# Patient Record
Sex: MALE | Race: WHITE | Employment: UNEMPLOYED | URBAN - METROPOLITAN AREA
[De-identification: names, ages, dates, MRNs, and addresses within clinical notes are randomized per-mention and may not be internally consistent; named-entity substitution may affect disease eponyms.]

---

## 2021-12-18 ENCOUNTER — APPOINTMENT (OUTPATIENT)
Dept: CT IMAGING | Facility: CLINIC | Age: 31
End: 2021-12-18
Attending: EMERGENCY MEDICINE
Payer: COMMERCIAL

## 2021-12-18 ENCOUNTER — HOSPITAL ENCOUNTER (EMERGENCY)
Facility: CLINIC | Age: 31
Discharge: HOME | End: 2021-12-18
Attending: EMERGENCY MEDICINE | Admitting: EMERGENCY MEDICINE
Payer: COMMERCIAL

## 2021-12-18 VITALS
RESPIRATION RATE: 20 BRPM | OXYGEN SATURATION: 99 % | BODY MASS INDEX: 28.6 KG/M2 | HEIGHT: 75 IN | DIASTOLIC BLOOD PRESSURE: 99 MMHG | SYSTOLIC BLOOD PRESSURE: 142 MMHG | HEART RATE: 88 BPM | TEMPERATURE: 97.4 F | WEIGHT: 230 LBS

## 2021-12-18 DIAGNOSIS — M54.50 ACUTE MIDLINE LOW BACK PAIN WITHOUT SCIATICA: ICD-10-CM

## 2021-12-18 DIAGNOSIS — R10.84 ABDOMINAL PAIN, GENERALIZED: ICD-10-CM

## 2021-12-18 LAB
ALBUMIN SERPL-MCNC: 3.7 G/DL (ref 3.4–5)
ALP SERPL-CCNC: 85 U/L (ref 40–150)
ALT SERPL W P-5'-P-CCNC: 38 U/L (ref 0–70)
ANION GAP SERPL CALCULATED.3IONS-SCNC: 5 MMOL/L (ref 3–14)
AST SERPL W P-5'-P-CCNC: 36 U/L (ref 0–45)
BASOPHILS # BLD AUTO: 0 10E3/UL (ref 0–0.2)
BASOPHILS NFR BLD AUTO: 1 %
BILIRUB SERPL-MCNC: 0.8 MG/DL (ref 0.2–1.3)
BUN SERPL-MCNC: 20 MG/DL (ref 7–30)
CALCIUM SERPL-MCNC: 9.1 MG/DL (ref 8.5–10.1)
CHLORIDE BLD-SCNC: 107 MMOL/L (ref 94–109)
CO2 SERPL-SCNC: 26 MMOL/L (ref 20–32)
CREAT SERPL-MCNC: 0.99 MG/DL (ref 0.66–1.25)
EOSINOPHIL # BLD AUTO: 0.2 10E3/UL (ref 0–0.7)
EOSINOPHIL NFR BLD AUTO: 3 %
ERYTHROCYTE [DISTWIDTH] IN BLOOD BY AUTOMATED COUNT: 13.5 % (ref 10–15)
GFR SERPL CREATININE-BSD FRML MDRD: >90 ML/MIN/1.73M2
GLUCOSE BLD-MCNC: 101 MG/DL (ref 70–99)
HCT VFR BLD AUTO: 39.7 % (ref 40–53)
HGB BLD-MCNC: 12.6 G/DL (ref 13.3–17.7)
HOLD SPECIMEN: NORMAL
IMM GRANULOCYTES # BLD: 0 10E3/UL
IMM GRANULOCYTES NFR BLD: 0 %
LIPASE SERPL-CCNC: 68 U/L (ref 73–393)
LYMPHOCYTES # BLD AUTO: 1.2 10E3/UL (ref 0.8–5.3)
LYMPHOCYTES NFR BLD AUTO: 21 %
MCH RBC QN AUTO: 27.3 PG (ref 26.5–33)
MCHC RBC AUTO-ENTMCNC: 31.7 G/DL (ref 31.5–36.5)
MCV RBC AUTO: 86 FL (ref 78–100)
MONOCYTES # BLD AUTO: 0.5 10E3/UL (ref 0–1.3)
MONOCYTES NFR BLD AUTO: 8 %
NEUTROPHILS # BLD AUTO: 4 10E3/UL (ref 1.6–8.3)
NEUTROPHILS NFR BLD AUTO: 67 %
NRBC # BLD AUTO: 0 10E3/UL
NRBC BLD AUTO-RTO: 0 /100
PLATELET # BLD AUTO: 291 10E3/UL (ref 150–450)
POTASSIUM BLD-SCNC: 3.3 MMOL/L (ref 3.4–5.3)
PROT SERPL-MCNC: 7.3 G/DL (ref 6.8–8.8)
RBC # BLD AUTO: 4.62 10E6/UL (ref 4.4–5.9)
SODIUM SERPL-SCNC: 138 MMOL/L (ref 133–144)
WBC # BLD AUTO: 5.9 10E3/UL (ref 4–11)

## 2021-12-18 PROCEDURE — 85025 COMPLETE CBC W/AUTO DIFF WBC: CPT | Performed by: EMERGENCY MEDICINE

## 2021-12-18 PROCEDURE — 96374 THER/PROPH/DIAG INJ IV PUSH: CPT | Mod: 59

## 2021-12-18 PROCEDURE — 36415 COLL VENOUS BLD VENIPUNCTURE: CPT | Performed by: EMERGENCY MEDICINE

## 2021-12-18 PROCEDURE — 74177 CT ABD & PELVIS W/CONTRAST: CPT

## 2021-12-18 PROCEDURE — 99285 EMERGENCY DEPT VISIT HI MDM: CPT | Mod: 25

## 2021-12-18 PROCEDURE — 80053 COMPREHEN METABOLIC PANEL: CPT | Performed by: EMERGENCY MEDICINE

## 2021-12-18 PROCEDURE — 250N000009 HC RX 250: Performed by: EMERGENCY MEDICINE

## 2021-12-18 PROCEDURE — 96375 TX/PRO/DX INJ NEW DRUG ADDON: CPT | Mod: 59

## 2021-12-18 PROCEDURE — 250N000011 HC RX IP 250 OP 636: Performed by: EMERGENCY MEDICINE

## 2021-12-18 PROCEDURE — 83690 ASSAY OF LIPASE: CPT | Performed by: EMERGENCY MEDICINE

## 2021-12-18 RX ORDER — IOPAMIDOL 755 MG/ML
115 INJECTION, SOLUTION INTRAVASCULAR ONCE
Status: COMPLETED | OUTPATIENT
Start: 2021-12-18 | End: 2021-12-18

## 2021-12-18 RX ORDER — ONDANSETRON 2 MG/ML
4 INJECTION INTRAMUSCULAR; INTRAVENOUS EVERY 30 MIN PRN
Status: DISCONTINUED | OUTPATIENT
Start: 2021-12-18 | End: 2021-12-18 | Stop reason: HOSPADM

## 2021-12-18 RX ORDER — HYDROMORPHONE HYDROCHLORIDE 1 MG/ML
0.5 INJECTION, SOLUTION INTRAMUSCULAR; INTRAVENOUS; SUBCUTANEOUS
Status: DISCONTINUED | OUTPATIENT
Start: 2021-12-18 | End: 2021-12-18 | Stop reason: HOSPADM

## 2021-12-18 RX ORDER — KETOROLAC TROMETHAMINE 15 MG/ML
15 INJECTION, SOLUTION INTRAMUSCULAR; INTRAVENOUS ONCE
Status: COMPLETED | OUTPATIENT
Start: 2021-12-18 | End: 2021-12-18

## 2021-12-18 RX ADMIN — KETOROLAC TROMETHAMINE 15 MG: 15 INJECTION, SOLUTION INTRAMUSCULAR; INTRAVENOUS at 14:50

## 2021-12-18 RX ADMIN — SODIUM CHLORIDE 73 ML: 9 INJECTION, SOLUTION INTRAVENOUS at 16:01

## 2021-12-18 RX ADMIN — ONDANSETRON 4 MG: 2 INJECTION INTRAMUSCULAR; INTRAVENOUS at 14:50

## 2021-12-18 RX ADMIN — IOPAMIDOL 115 ML: 755 INJECTION, SOLUTION INTRAVENOUS at 16:01

## 2021-12-18 ASSESSMENT — MIFFLIN-ST. JEOR: SCORE: 2073.9

## 2021-12-18 NOTE — ED NOTES
Went into room pt asleep at this time .  Pt awakened and pt then falls asleep quickly  Visitor with pt

## 2021-12-18 NOTE — ED TRIAGE NOTES
Non traumatic mid to lower back pain last few days and woke up with worse pain and having difficulty moving around. Also report right groin pain with a bulge, might have hernia.

## 2021-12-18 NOTE — ED PROVIDER NOTES
"  History     Chief Complaint:    Groin Pain and Back Pain      HPI   Eddie Castillo is a 31 year old male who presents with mid back pain for the last few weeks.  Denies any radicular symptoms.  He denies any trauma.  Describes it as a tight spasm type feeling.  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  Denies any injury or falls.  He is then also noted some groin pain and swelling with feelings of a hernia.  Been ongoing for a couple of weeks.  Last night around 3 AM his roommate heard him groaning in bed and that has persisted.  He is having mid back pain as well as groin pain.  He has had some nausea.  Denies any diarrhea or constipation.  Denies fevers or chills.  Denies urinary symptoms.    Review of Systems  Positive for mid back pain and groin pain with nausea negative for radicular symptoms down one leg or the other loss of bowel or bladder function saddle anesthesia fevers or chills all other systems negative    Allergies:      No Known Allergies      Medications:      No current outpatient medications on file.      Past Medical History:        History reviewed. No pertinent past medical history.  There are no problems to display for this patient.       Past Surgical History:        History reviewed. No pertinent surgical history.    Family History:        No family history on file.    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    Physical Exam     Patient Vitals for the past 24 hrs:   BP Temp Temp src Pulse Resp SpO2 Height Weight   12/18/21 1630 (!) 142/99 -- -- -- 20 99 % -- --   12/18/21 1320 (!) 162/76 97.4  F (36.3  C) Oral 88 24 97 % 1.905 m (6' 3\") 104.3 kg (230 lb)       Physical Exam  GENERAL: well developed, pleasant, moaning in pain looks uncomfortable  HEAD: atraumatic  EYES: pupils reactive, extraocular muscles intact, conjunctivae normal  ENT:  mucus membranes " moist  NECK:  trachea midline, normal range of motion  RESPIRATORY: no tachypnea, breath sounds clear to auscultation   CVS: normal S1/S2, no murmurs, intact distal pulses  ABDOMEN: Soft generalized lower abdominal pain fullness to the suprapubic and groin area bilaterally  MUSCULOSKELETAL: Generalized low back pain and mid back pain no rash straight leg raises normal good dorsiflexion plantarflexion of the great toe bilaterally  SKIN: warm and dry, no acute rashes or ulceration  NEURO: GCS 15, cranial nerves intact, alert and oriented x3  PSYCH:  Mood/affect normal        Emergency Department Course       Imaging:  CT Abdomen Pelvis w Contrast   Final Result   IMPRESSION:    1.  No acute process demonstrated.          Laboratory:  Labs Ordered and Resulted from Time of ED Arrival to Time of ED Departure   COMPREHENSIVE METABOLIC PANEL - Abnormal       Result Value    Sodium 138      Potassium 3.3 (*)     Chloride 107      Carbon Dioxide (CO2) 26      Anion Gap 5      Urea Nitrogen 20      Creatinine 0.99      Calcium 9.1      Glucose 101 (*)     Alkaline Phosphatase 85      AST 36      ALT 38      Protein Total 7.3      Albumin 3.7      Bilirubin Total 0.8      GFR Estimate >90     LIPASE - Abnormal    Lipase 68 (*)    CBC WITH PLATELETS AND DIFFERENTIAL - Abnormal    WBC Count 5.9      RBC Count 4.62      Hemoglobin 12.6 (*)     Hematocrit 39.7 (*)     MCV 86      MCH 27.3      MCHC 31.7      RDW 13.5      Platelet Count 291      % Neutrophils 67      % Lymphocytes 21      % Monocytes 8      % Eosinophils 3      % Basophils 1      % Immature Granulocytes 0      NRBCs per 100 WBC 0      Absolute Neutrophils 4.0      Absolute Lymphocytes 1.2      Absolute Monocytes 0.5      Absolute Eosinophils 0.2      Absolute Basophils 0.0      Absolute Immature Granulocytes 0.0      Absolute NRBCs 0.0         Procedures:      Emergency Department Course:           Reviewed:    I reviewed nursing notes, vitals and past  history    Assessments:   I obtained history and examined the patient as noted above.    I rechecked the patient and explained findings.       Consults:            Interventions:    Medications   ketorolac (TORADOL) injection 15 mg (15 mg Intravenous Given 12/18/21 1450)   Saline (73 mLs As instructed Given 12/18/21 1601)   iopamidol (ISOVUE-370) solution 115 mL (115 mLs Intravenous Given 12/18/21 1601)       Disposition:  The patient was discharged to home.    Impression & Plan            CMS Diagnoses:        Medical Decision Making:  Patient presents with mid back pain for the last couple of weeks without radicular symptoms and now groin pain and feeling he may have a hernia.  Patient is up slightly different with his presentation as he seems to be moaning and groaning and other times coming into the room he is sleeping.  Patient has no radicular symptoms and straight leg raise is normal.  Do not suspect sciatica.  He does do physical labor and lifting certainly could be musculoskeletal.  Kidney stone is considered as well.  Not having groin pain although.his symphysis pubis is slightly full there is no obvious hernia.  No obvious hernia on exam.  CT abdomen pelvis is negative for acute pathology.  Patient became quite upset with ER nurse when she requested a urinalysis.  She was concerned about giving him something stronger for narcotics as when she went into see him he was sleeping soundly.  Patient given Toradol went in to give him his results and he was resting again.  Discussed with him the normal findings.  Can try Tylenol and Motrin for his back pain but not finding anything on exam for his groin pain.  He describes a groin pain in the suprapubic area and not into the testicles.  He is nontender testicular exam.    Critical Care time:  none    Covid-19  Eddie Jose Castillo was evaluated during a global COVID-19 pandemic, which necessitated consideration that the patient might be at risk for  infection with the SARS-CoV-2 virus that causes COVID-19.   Applicable protocols for evaluation were followed during the patient's care.   COVID-19 was considered as part of the patient's evaluation. The plan for testing is:  the patient was referred for outpatient testing.        Diagnosis:    ICD-10-CM    1. Abdominal pain, generalized  R10.84    2. Acute midline low back pain without sciatica  M54.50        Discharge Medications:  There are no discharge medications for this patient.        Scribe Disclosure:  Gilbert ZAVALA MD, am serving as a scribe at 2:26 PM on 12/18/2021 to document services personally performed by Gilbert Dobson MD based on my observations and the provider's statements to me.      Gilbert Dobson MD  12/18/21 [id number]

## 2021-12-18 NOTE — ED NOTES
"Informed pt need for urinalysis and pt said\" no and started to holler you dont need one and I will not give you one.\" roommate trying to reason with pt. Pt nohelia.  Md aware    "

## 2024-04-18 ENCOUNTER — OFFICE VISIT (OUTPATIENT)
Dept: BEHAVIORAL HEALTH | Facility: CLINIC | Age: 34
End: 2024-04-18
Payer: MEDICAID

## 2024-04-18 VITALS — HEART RATE: 78 BPM | DIASTOLIC BLOOD PRESSURE: 80 MMHG | SYSTOLIC BLOOD PRESSURE: 118 MMHG

## 2024-04-18 DIAGNOSIS — Z51.81 ENCOUNTER FOR MONITORING OPIOID MAINTENANCE THERAPY: ICD-10-CM

## 2024-04-18 DIAGNOSIS — F11.20 OPIOID USE DISORDER, SEVERE, DEPENDENCE (H): Primary | ICD-10-CM

## 2024-04-18 DIAGNOSIS — Z79.891 ENCOUNTER FOR MONITORING OPIOID MAINTENANCE THERAPY: ICD-10-CM

## 2024-04-18 DIAGNOSIS — F15.90 STIMULANT USE DISORDER: ICD-10-CM

## 2024-04-18 DIAGNOSIS — F32.A DEPRESSION, UNSPECIFIED DEPRESSION TYPE: ICD-10-CM

## 2024-04-18 DIAGNOSIS — F41.9 ANXIETY: ICD-10-CM

## 2024-04-18 LAB
AMPHETAMINE QUAL URINE POCT: NEGATIVE
BARBITURATE QUAL URINE POCT: NEGATIVE
BENZODIAZEPINE QUAL URINE POCT: NEGATIVE
BUPRENORPHINE QUAL URINE POCT: ABNORMAL
COCAINE QUAL URINE POCT: NEGATIVE
CREAT UR-MCNC: 81 MG/DL
CREATININE QUAL URINE POCT: ABNORMAL
INTERNAL QC QUAL URINE POCT: ABNORMAL
MDMA QUAL URINE POCT: NEGATIVE
METHADONE QUAL URINE POCT: NEGATIVE
METHAMPHETAMINE QUAL URINE POCT: NEGATIVE
OPIATE QUAL URINE POCT: NEGATIVE
OXYCODONE QUAL URINE POCT: NEGATIVE
PH QUAL URINE POCT: ABNORMAL
PHENCYCLIDINE QUAL URINE POCT: NEGATIVE
POCT KIT EXPIRATION DATE: ABNORMAL
POCT KIT LOT NUMBER: ABNORMAL
SPECIFIC GRAVITY POCT: 1.01
TEMPERATURE URINE POCT: ABNORMAL
THC QUAL URINE POCT: NEGATIVE

## 2024-04-18 PROCEDURE — 99204 OFFICE O/P NEW MOD 45 MIN: CPT | Performed by: NURSE PRACTITIONER

## 2024-04-18 PROCEDURE — 80305 DRUG TEST PRSMV DIR OPT OBS: CPT | Performed by: NURSE PRACTITIONER

## 2024-04-18 PROCEDURE — G0480 DRUG TEST DEF 1-7 CLASSES: HCPCS | Performed by: NURSE PRACTITIONER

## 2024-04-18 RX ORDER — BUPRENORPHINE AND NALOXONE 12; 3 MG/1; MG/1
1 FILM, SOLUBLE BUCCAL; SUBLINGUAL DAILY
Qty: 28 FILM | Refills: 0 | Status: SHIPPED | OUTPATIENT
Start: 2024-04-18

## 2024-04-18 RX ORDER — BUPROPION HYDROCHLORIDE 150 MG/1
150 TABLET ORAL EVERY MORNING
COMMUNITY
Start: 2024-04-18

## 2024-04-18 RX ORDER — ESCITALOPRAM OXALATE 10 MG/1
10 TABLET ORAL DAILY
COMMUNITY
Start: 2024-04-18

## 2024-04-18 RX ORDER — BUPRENORPHINE AND NALOXONE 8; 2 MG/1; MG/1
1 FILM, SOLUBLE BUCCAL; SUBLINGUAL DAILY
Qty: 14 FILM | Refills: 0 | Status: SHIPPED | OUTPATIENT
Start: 2024-04-18

## 2024-04-18 RX ORDER — QUETIAPINE FUMARATE 50 MG/1
50-100 TABLET, FILM COATED ORAL AT BEDTIME
COMMUNITY
Start: 2024-04-18

## 2024-04-18 ASSESSMENT — COLUMBIA-SUICIDE SEVERITY RATING SCALE - C-SSRS
2. HAVE YOU ACTUALLY HAD ANY THOUGHTS OF KILLING YOURSELF?: NO
TOTAL  NUMBER OF INTERRUPTED ATTEMPTS LIFETIME: NO
1. HAVE YOU WISHED YOU WERE DEAD OR WISHED YOU COULD GO TO SLEEP AND NOT WAKE UP?: NO
ATTEMPT LIFETIME: NO
TOTAL  NUMBER OF ABORTED OR SELF INTERRUPTED ATTEMPTS LIFETIME: NO
6. HAVE YOU EVER DONE ANYTHING, STARTED TO DO ANYTHING, OR PREPARED TO DO ANYTHING TO END YOUR LIFE?: NO

## 2024-04-18 ASSESSMENT — PATIENT HEALTH QUESTIONNAIRE - PHQ9: SUM OF ALL RESPONSES TO PHQ QUESTIONS 1-9: 18

## 2024-04-18 NOTE — PROGRESS NOTES
[facility] Initial Visit    ASSESSMENT/PLAN                                                      There are no diagnoses linked to this encounter.       No follow-ups on file.      Patient counseling completed today:  Discussed mechanism of action, potential risks/benefits/side effects of medications and other recommendations above.    Discussed risk of precipitated withdrawal with initiation of buprenorphine in the presence of full opioid agonists.    Reviewed directions for initiation of buprenorphine to reduce risk of precipitated withdrawal and maximize efficacy.    Harm reduction counseling including never use alone, availability of naloxone, avoiding combination of opioids with benzodiazepines, alcohol, or other sedatives, safer administration.      Discussed importance of avoiding isolation, building a network of supportive relationships, avoiding people/places/things associated with past use to reduce risk of relapse; including motivational interviewing regarding psychosocial treatment for addiction.     SUBJECTIVE                                                      CC/HPI:  Eddie Castillo is a 33 year old male with PMH ***, and opioid use disorder who presents to the [facility] for initial visit.      Brief History:  Eddie Castillo was first seen in [facility] on 04/18/24. They were referred by ***.   Patient's reasons for seeking treatment include ***.     Substance Use History :  Opioids:   Age at first use: ***  Current use: substance: ***; quantity ***; route: *** ; timing of last use: ***;      IV drug use: {YES/NO:60}   History of overdose: {YES/NO:60}  Previous residential or outpatient treatments for addiction : {YES/NO:60}  Previous medication treatments for addiction: {YES/NO:60}  Longest period of sobriety: ***  Medical complications related to substance use: ***  Hepatitis C: ***; Date of most recent testing: ***  HIV: ***; Date of most recent testing:  ***    Taking buprenorphine? {YES/NO:60} How much per day? ***  Number of buprenorphine films/tablets remaining currently: ***  Side effects related to buprenorphine {YES/NO:60} ***  Narcan currently available: {Narcan:373629}    DSM-5 OUD criteria met:  Taken in larger amounts/greater time spent in behavior over longer period of time than intended,{YES/NO:60}   Persistent desire or unsuccessful efforts to cut down or control use/behavior, {YES/NO:60}   A great deal of time is spent in activities necessary to obtain the substance/participate in the behavior or recover from its effects, {YES/NO:60}   Cravings, {YES/NO:60}   Recurrent use/behavior resulting in failure to fulfill major role obligations at work, school, or home, {YES/NO:60}   Continued use/behavior despite having persistent or recurrent social or interpersonal problems caused or exacerbated by effects of use/behavior, {YES/NO:60}   Important social, occupational, or recreational activities are given up or reduced because of use/behavior, {YES/NO:60}   Recurrent use/behavior in situations in which it is physically hazardous, {YES/NO:60}   Continued use/behavior despite knowledge of having a persistent or recurrent physical or psychological problem that is likely to have been caused or exacerbated by use/behavior, {YES/NO:60}   Tolerance, {YES/NO:60}    Withdrawal, {YES/NO:60}     Other Addiction History:  Stimulants (cocaine, methamphetamine, MDMA/ecstasy)   ***  Sedatives/hypnotics/anxiolytics: (benzodiazepines, GHB, Ambien, phenobarbital)  ***  Alcohol:   ***  Nicotine: (cigarettes, vaping, chew/snuff)  ***  Cannabis:   ***  Hallucinogens/Dissociatives: (acid, mushrooms, ketamine)  ***  Eating disorder:  ***  Gambling:   ***        [place] Prescription Drug Monitoring Program Reviewed:  Yes; ***      Pregnancy Status   LMP: ***  Birth control/barriers: ***  Interested in birth control if none currently? {YES/NO:60}  Urine pregnancy test specimen  "obtained and sent to lab:***          PAST PSYCHIATRIC HISTORY:  Diagnoses- ***  Suicide Attempts: {YES / NO:990090::\"No\"}   Hospitalizations: {YES / NO:321060::\"No\"}          No data to display                  If PHQ-9 score of 15 or higher, has [facility] therapist or provider been notified? {YES/NO:181251}    Any current suicidal ideation? {YES/NO:434105}  If yes, has [facility] therapist or provider been notified? {YES/NO:762935}    Mental health provider: *** (follow up on MH referral if needed)      Social History  Housing status: {Living Status:688096}  Employment status: {EMPLOYMENT STATUS:39060781}  Relationship status: {Relationship Status:405317}  Children: { :575079}  Legal: ***  Insurance needs: ***  Contact information up to date? ***    3rd Party Involvement *** (please obtain JACQUELYN if pt would like to include)        Medications:  Current Outpatient Medications   Medication Sig Dispense Refill    HYDROcodone-acetaminophen 5-325 MG per tablet Take 1 tablet by mouth every 6 hours as needed for pain 20 tablet 0     No current facility-administered medications for this visit.       No Known Allergies    No past medical history on file.    No past surgical history on file.    No family history on file.      REVIEW OF SYSTEMS:  General: Withdrawal symptoms as described below.  No recent fevers.   Eyes:  No vision concerns.  No jaundice.    Resp: No coughing, wheezing or shortness of breath  CV: No chest pains or palpitations  GI: No complaints other than as above  : No urinary frequency or dysuria, no discharge  Musculoskeletal: No significant muscle or joint pains other than as above.  No edema  Neurologic: No numbness, tingling, weakness, problems with balance or coordination  Psychiatric: No acute concerns other than as above.   Skin: No rashes or areas of acute infection    OBJECTIVE                                                        Clinical Opioid Withdrawal Scale (COWS)    Resting " "Pulse Rate  {Resting Pulse Rate:874059}   Sweating    (over past 1/2 hour) {sweatin}   Restlessness  {restlessness:779927}   Pupil size  {pupil size:412631}   Bone or Joint Aches    (acute only) {bone joint aches:500535}   Runny nose or tearing    (unrelated to cold/allergies) {runny nose tearin}   GI Upset    (over past 1/2 hour) {gi upset:284772}   Tremor    (outstretched hands) {tremor score:224306}   Yawning    (during assessment) {yawnin}   Anxiety/Irritability {anxiety/irritability:746136}   Gooseflesh skin {gooseflesh:256355}     TOTAL SCORE  Add column for score   ***       There were no vitals taken for this visit.    Physical Exam    Labs:    UDS:   No results found for: \"BUP\", \"BZO\", \"BAR\", \"RHODA\", \"MAMP\", \"AMP\", \"MDMA\", \"MTD\", \"QOE786\", \"OXY\", \"PCP\", \"THC\", \"TEMP\", \"SGPOCT\"    *[facility] urine drug screen does not screen for Fentanyl    No results found for this or any previous visit (from the past 720 hour(s)).            At least *** min spent on day of encounter in review of medical record,  review, obtaining histories, discussing recommendations, counseling, providing support.      [place] [address] [facility], [address]  [place] [address]  [phone]        "

## 2024-04-18 NOTE — NURSING NOTE
RN was notified by rooming staff that the patient had an elevated PHQ-9 score and answered greater than 0 on question 9 indicating thoughts that they would be better off dead, or hurting themself in some way. RN met with patient to complete the C-SSRS.    Patient denies SI. He states his current stressor is running out of his short term supply of Suboxone he got from [address] [place] [address]. He denies any history of a suicide attempt. He has engaged in self harm via burning and cutting as a teen. He verbally contracts for safety. He states he has a psychiatrist and takes his medications.     Patient  denies current or recent suicidal ideation or behavior    [place]-Suicide Severity Rating Scale (C-SSRS) score: no risk    RN updated the provider with C-SSRS risk level.     Current stressors/contributing factors include: Running out of Suboxone     Patient identified the following protective factors: history of seeking help when needed    Patient was given the number for the National Suicide Prevention Line (988) along with a list of crisis resources and numbers.      Susi Lopez RN on 4/18/2024 at 3:50 PM

## 2024-04-18 NOTE — PROGRESS NOTES
"[facility] Recovery Clinic Initial Visit    ASSESSMENT/PLAN                                                      1. Opioid use disorder, severe, dependence (H)  33 year old with OUD who has been using heroin/fentanyl IV for the last 15 years. He is using a 3.5 grams daily, last use was 4/3/2024 when he went to inpatient detox. He was started on suboxone, and is currently taking 32 gm TDD. He is reporting control of his symptoms. \"I feel normal\". He started residential treatment at [facility] on 4/15. He is here to establish care.   Plan to continue his current effective dose of suboxone, 12 mg BID and 8 mg daily.   Providing access to narcan.  Continue programming at [facility] and follow recommendations.   - Drugs of Abuse Screen Urine (POC CUPS) POCT; Standing  - Drug Confirmation Panel Urine with Creat - lab collect; Future  - Drugs of Abuse Screen Urine (POC CUPS) POCT  - Drug Confirmation Panel Urine with Creat - lab collect  - Buprenorphine HCl-Naloxone HCl (SUBOXONE) 12-3 MG FILM per film; Place 1 Film under the tongue daily  Dispense: 28 Film; Refill: 0  - buprenorphine HCl-naloxone HCl (SUBOXONE) 8-2 MG per film; Place 1 Film under the tongue daily  Dispense: 14 Film; Refill: 0  - naloxone (NARCAN) 4 MG/0.1ML nasal spray; Spray 1 spray (4 mg) into one nostril alternating nostrils as needed for opioid reversal every 2-3 minutes until assistance arrives  Dispense: 0.2 mL; Refill: 11    2. Stimulant use disorder  Has been using meth IV daily for many years, using 0.25-1 oz of meth daily, last use was 4/3/2024. Denies cravings.   He is prescribed bupropion xl 150 mg daily by psychiatry, and is reporting nightmares for past 4 days. He has taken bupropion in the past and did not experience side effects. Has appointment with psychiatry 4/30.   Continue programming at [facility].     3. Encounter for monitoring opioid maintenance therapy  - Drugs of Abuse Screen Urine (POC CUPS) POCT; Standing  - Drug Confirmation Panel " "Urine with Creat - lab collect; Future  - Drugs of Abuse Screen Urine (POC CUPS) POCT  - Drug Confirmation Panel Urine with Creat - lab collect    4. Depression, unspecified depression type  5. Anxiety  Elevated PHQ-9 with recent SI without plan or intent. , denies current symptoms.States he has had a \"rough\" couple of months related to his substance use. Since going to detox and now treatment he is starting to feel hope.   Is prescribed escitalopram 10 mg and bupropion  mg daily which were recently resumed by psychiatry, has folllow up 4/30.   Informed of clinic therapist and he will consider at follow up.              Return in about 15 days (around 5/3/2024) for Follow up, in [id number] pm.      Patient counseling completed today:  Discussed mechanism of action, potential risks/benefits/side effects of medications and other recommendations above.    Discussed risk of precipitated withdrawal with initiation of buprenorphine in the presence of full opioid agonists.    Reviewed directions for initiation of buprenorphine to reduce risk of precipitated withdrawal and maximize efficacy.    Harm reduction counseling including never use alone, availability of naloxone, avoiding combination of opioids with benzodiazepines, alcohol, or other sedatives, safer administration.      Discussed importance of avoiding isolation, building a network of supportive relationships, avoiding people/places/things associated with past use to reduce risk of relapse; including motivational interviewing regarding psychosocial treatment for addiction.     SUBJECTIVE                                                      CC/HPI:  Eddie Castillo is a 33 year old male with PMH Depression, anxiety, and opioid use disorder who presents to the Recovery Clinic for initial visit.      Brief History:  Eddie Castillo was first seen in Recovery Clinic on 04/18/24. They were referred by Thao .   Patient's reasons for seeking treatment include " for himself .     Started using percocet pain pills when he was a teenager. His use progressed to daily heroin use and eventual fentanyl use, using 3.5 grams IV daily. Last use 4/3/2024. He went to detox at [address] [place] [address] 4/3-4/15 who started him on suboxone 32 mg. He started treatment at [facility] 4/15 and is here to establish care to continue buprenorphine.      Substance Use History :  Opioids:   Age at first use: 16  Current use: substance: Fetnanyl, heroin and meth; quantity daily ; route: IV ; timing of last use: 4/3/24;      IV drug use: Yes:    History of overdose: Yes:   Previous residential or outpatient treatments for addiction : Yes: [facility]   Previous medication treatments for addiction: Yes:   Longest period of sobriety: 2 years   Medical complications related to substance use: none  Hepatitis C: negative ; Date of most recent testing: about 3 weeks ago   HIV: negative ; Date of most recent testing: about 3 weeks ago     Taking buprenorphine? Yes: suboxone How much per day? 32mg   Number of buprenorphine films/tablets remaining currently: unsure   Side effects related to buprenorphine No   Narcan currently available: Yes      Other Addiction History:  Stimulants (cocaine, methamphetamine, MDMA/ecstasy)   Using meth 0.25-1 oz daily IV, last use 4/3/2024  Sedatives/hypnotics/anxiolytics: (benzodiazepines, GHB, Ambien, phenobarbital)  none  Alcohol:   None   Nicotine: (cigarettes, vaping, chew/snuff)  Using ENDS  daily,   Cannabis:   Not really   Hallucinogens/Dissociatives: (acid, mushrooms, ketamine)  None   Eating disorder:  none  Gambling:   None         [place] Prescription Drug Monitoring Program Reviewed:  Yes; no prescription refills              PAST PSYCHIATRIC HISTORY:  Diagnoses- manic depressive, PTSD, generalized anxiety   Suicide Attempts: No   Hospitalizations: No         4/18/2024     3:00 PM   PHQ   PHQ-9 Total Score 18   Q9: Thoughts of better off dead/self-harm past 2 weeks Several  days         If PHQ-9 score of 15 or higher, has Recovery Clinic therapist or provider been notified? N/A    Any current suicidal ideation? No  If yes, has Recovery Clinic therapist or provider been notified? N/A    Mental health provider: yes  (follow up on MH referral if needed)      Social History  Housing status:  [facility]  Employment status: Unemployed, not seeking work  Relationship status: Single  Children: 1 child  Legal: none  Insurance needs: active   Contact information up to date?     [employer]  (please obtain JACQUELYN if pt would like to include)        Medications:  Current Outpatient Medications   Medication Sig Dispense Refill    Buprenorphine HCl-Naloxone HCl (SUBOXONE) 12-3 MG FILM per film Place 1 Film under the tongue daily 28 Film 0    buprenorphine HCl-naloxone HCl (SUBOXONE) 8-2 MG per film Place 1 Film under the tongue daily 14 Film 0    buPROPion (WELLBUTRIN XL) 150 MG 24 hr tablet Take 1 tablet (150 mg) by mouth every morning      escitalopram (LEXAPRO) 10 MG tablet Take 1 tablet (10 mg) by mouth daily      naloxone (NARCAN) 4 MG/0.1ML nasal spray Spray 1 spray (4 mg) into one nostril alternating nostrils as needed for opioid reversal every 2-3 minutes until assistance arrives 0.2 mL 11    QUEtiapine (SEROQUEL) 50 MG tablet Take 1-2 tablets ( mg) by mouth at bedtime       No current facility-administered medications for this visit.       No Known Allergies    No past medical history on file.    No past surgical history on file.    No family history on file.      REVIEW OF SYSTEMS:  General: Withdrawal symptoms as described below.  No recent fevers.   Eyes:  No vision concerns.  No jaundice.    Resp: No coughing, wheezing or shortness of breath  CV: No chest pains or palpitations  GI: No complaints other than as above  : No urinary frequency or dysuria, no discharge  Musculoskeletal: No significant muscle or joint pains other than as above.  No edema  Neurologic: No numbness,  tingling, weakness, problems with balance or coordination  Psychiatric: No acute concerns other than as above.   Skin: No rashes or areas of acute infection    OBJECTIVE                                                              /80   Pulse 78     Physical Exam  HENT:      Nose: No congestion.   Cardiovascular:      Rate and Rhythm: Normal rate.   Pulmonary:      Effort: Pulmonary effort is normal. No respiratory distress.   Neurological:      General: No focal deficit present.      Mental Status: He is alert and oriented to person, place, and time.   Psychiatric:         Attention and Perception: Attention normal.         Mood and Affect: Mood is anxious and depressed.         Speech: Speech normal.         Behavior: Behavior is cooperative.         Thought Content: Thought content normal. Thought content does not include suicidal ideation. Thought content does not include suicidal plan.         Judgment: Judgment normal.         Labs:    UDS:   Lab Results   Component Value Date    BUP Screen Positive (A) 04/18/2024    BZO Negative 04/18/2024    BAR Negative 04/18/2024    RHODA Negative 04/18/2024    MAMP Negative 04/18/2024    AMP Negative 04/18/2024    MDMA Negative 04/18/2024    MTD Negative 04/18/2024    HXT246 Negative 04/18/2024    OXY Negative 04/18/2024    PCP Negative 04/18/2024    THC Negative 04/18/2024    TEMP 92 F 04/18/2024    SGPOCT 1.015 04/18/2024       *POC urine drug screen does not screen for Fentanyl    Recent Results (from the past 720 hour(s))   Drugs of Abuse Screen Urine (POC CUPS) POCT    Collection Time: 04/18/24  3:35 PM   Result Value Ref Range    POCT Kit Lot Number [id number]     POCT Kit Expiration Date 10/23/25     Temperature Urine POCT 92 F 90 F, 92 F, 94 F, 96 F, 98 F, 100 F    Specific [place] POCT 1.015 1.005, 1.015, 1.025    pH Qual Urine POCT 9 pH 4 pH, 5 pH, 7 pH, 9 pH    Creatinine Qual Urine POCT 50 mg/dL 20 mg/dL, 50 mg/dL, 100 mg/dL, 200 mg/dL    Internal QC Qual  Urine POCT Valid Valid    Amphetamine Qual Urine POCT Negative Negative    Barbiturate Qual Urine POCT Negative Negative    Buprenorphine Qual Urine POCT Screen Positive (A) Negative    Benzodiazepine Qual Urine POCT Negative Negative    Cocaine Qual Urine POCT Negative Negative    Methamphetamine Qual Urine POCT Negative Negative    MDMA Qual Urine POCT Negative Negative    Methadone Qual Urine POCT Negative Negative    Opiate Qual Urine POCT Negative Negative    Oxycodone Qual Urine POCT Negative Negative    Phencyclidine Qual Urine POCT Negative Negative    THC Qual Urine POCT Negative Negative   Urine Creatinine for Drug Screen Panel    Collection Time: 04/18/24  3:46 PM   Result Value Ref Range    Creatinine Urine for Drug Screen 81 mg/dL                 [facility]  [address], [address]  [place] [address] [phone]

## 2024-04-22 LAB
BUPRENORPHINE UR CFM-MCNC: 257 NG/ML
BUPRENORPHINE/CREAT UR: 317 NG/MG {CREAT}
NALOXONE UR CFM-MCNC: 641 NG/ML
NALOXONE: 791 NG/MG {CREAT}
NORBUPRENORPHINE UR CFM-MCNC: ABNORMAL NG/ML